# Patient Record
Sex: FEMALE | Race: WHITE | URBAN - METROPOLITAN AREA
[De-identification: names, ages, dates, MRNs, and addresses within clinical notes are randomized per-mention and may not be internally consistent; named-entity substitution may affect disease eponyms.]

---

## 2023-03-10 ENCOUNTER — OFFICE VISIT (OUTPATIENT)
Dept: URBAN - METROPOLITAN AREA CLINIC 51 | Facility: CLINIC | Age: 33
End: 2023-03-10
Payer: COMMERCIAL

## 2023-03-10 DIAGNOSIS — Z83.511 FAMILY HISTORY OF GLAUCOMA: ICD-10-CM

## 2023-03-10 DIAGNOSIS — H53.8 OTHER VISUAL DISTURBANCES: Primary | ICD-10-CM

## 2023-03-10 PROCEDURE — 99204 OFFICE O/P NEW MOD 45 MIN: CPT | Performed by: OPTOMETRIST

## 2023-03-10 PROCEDURE — 92133 CPTRZD OPH DX IMG PST SGM ON: CPT | Performed by: OPTOMETRIST

## 2023-03-10 ASSESSMENT — KERATOMETRY
OD: 45.63
OS: 45.88

## 2023-03-10 ASSESSMENT — VISUAL ACUITY
OD: 20/15
OS: 20/15

## 2023-03-10 ASSESSMENT — INTRAOCULAR PRESSURE
OD: 16
OS: 16

## 2023-03-10 NOTE — IMPRESSION/PLAN
Impression: Other visual disturbances: H53.8. Plan: No etiology noted that accounts for visual distortion (spot in vision) that patient notices. Assured patient that retina is attached 360 degrees with no sign of any retinal holes, tears or detachments. Will bring back to obtain baseline HVF to ensure no defects noted. If VF full, will plan to monitor.  

RTC for 30-2 HVF + follow up next available

## 2023-03-10 NOTE — IMPRESSION/PLAN
Impression: Family history of glaucoma: Z80.65.
 (+) Paternal grandmother Plan: Healthy ONH appearance RNFL/GCA OCT (03/10/2023): VALERIA OU Reviewed today's findings. Baseline diagnostic testing obtained. No signs of glaucoma. Monitor with RNFL/GCA OCT.